# Patient Record
(demographics unavailable — no encounter records)

---

## 2024-12-18 NOTE — HEALTH RISK ASSESSMENT
[Yes] : Yes [2 - 3 times a week (3 pts)] : 2 - 3  times a week (3 points) [1 or 2 (0 pts)] : 1 or 2 (0 points) [Never (0 pts)] : Never (0 points) [0] : 2) Feeling down, depressed, or hopeless: Not at all (0) [PHQ-2 Negative - No further assessment needed] : PHQ-2 Negative - No further assessment needed [Never] : Never [NO] : No

## 2024-12-18 NOTE — PHYSICAL EXAM
[No Acute Distress] : no acute distress [Well-Appearing] : well-appearing [Normal Sclera/Conjunctiva] : normal sclera/conjunctiva [EOMI] : extraocular movements intact [No Lymphadenopathy] : no lymphadenopathy [Supple] : supple [Normal] : normal rate, regular rhythm, normal S1 and S2 and no murmur heard [Non Tender] : non-tender [Non-distended] : non-distended [Normal Bowel Sounds] : normal bowel sounds [Normal Supraclavicular Nodes] : no supraclavicular lymphadenopathy [Normal Posterior Cervical Nodes] : no posterior cervical lymphadenopathy [Normal Anterior Cervical Nodes] : no anterior cervical lymphadenopathy [No Spinal Tenderness] : no spinal tenderness [No Joint Swelling] : no joint swelling [Grossly Normal Strength/Tone] : grossly normal strength/tone [No Rash] : no rash [Coordination Grossly Intact] : coordination grossly intact [Normal Gait] : normal gait [Normal Affect] : the affect was normal [Normal Insight/Judgement] : insight and judgment were intact [de-identified] : TTP of right upper glute/hip/low back on L- side

## 2024-12-18 NOTE — ASSESSMENT
[FreeTextEntry1] : #Familial Hypercholesterolemia Just started seeing a cardiologist who did extensive testing, has f/u appt with them today. Pt will send records, and we discussed likely medication initiation.  #Paresthesias Mostly likely in the setting of tight hips and low back 2/2 frequent cycling. No alarm symptoms, paresthesias have been present for years. Discussed stretching, therapy, symptomatic treatment.  #HM Ok with labs today, does not need STI testing, declines flu and covid vaccines. UTD on tdap.

## 2024-12-18 NOTE — PLAN
[FreeTextEntry1] : #Familial Hypercholesterolemia - Continue following with Cardiology, send records - Will defer medication management to Cardiology for now - Check labs today  #Paresthesias #LBP - PT referral - Stretches - Symptomatic treatment  #HM - Labs today

## 2024-12-18 NOTE — HISTORY OF PRESENT ILLNESS
[de-identified] : Mr. CINTRON is a 34 year y/o M with PMH of Familial Hypercholesteremia who presents as a new patient today to establish care. CC paresthesias, cholesterol  #Familial Hypercholesterolemia Seeing Cardiologist Got bloodwork, echo, EKG, AAA screen, stress test - had to stop after 25 minutes because HR couldn't get up to 185 Has appt today to review bloodwork Sister also recently started workup and diagnosed with FH Will send records  #Paresthesia Pins and needles feeling in legs >4 years occasionally, thinks nerve pain Rides peloton a lot, exercise helps Both legs but L>R Tingly pins and needles > shooting pain No numbness or weakness Sometimes arm cramps up Got Neuro testing 2-3 years ago and all jeremy including blood tests R handed Was doing PT for a while with possibly some improvement Turned to self-care and riding  PMH: FH PSH: R knee surgery ACL PCL meniscus x 2, and 2 meniscus surgeries since then. Tries to do biking for knee. Appendectomy in college Allergies: PCN - rash, went to allergist and confirmed, possibly anaphylaxis. Medications: none currently, no vitamins or supplements.  Fam Hx: Cholesterol, CVD, dad triple bypass after stent.  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 Low energy, thinks seasonal Firearms: N  #Health Maintenance Flu shot: due, declines Covid vaccine: due, declines Tdap: UTD STI screen: not today